# Patient Record
Sex: FEMALE | Race: WHITE | NOT HISPANIC OR LATINO
[De-identification: names, ages, dates, MRNs, and addresses within clinical notes are randomized per-mention and may not be internally consistent; named-entity substitution may affect disease eponyms.]

---

## 2020-04-23 ENCOUNTER — APPOINTMENT (OUTPATIENT)
Dept: GASTROENTEROLOGY | Facility: CLINIC | Age: 50
End: 2020-04-23
Payer: COMMERCIAL

## 2020-04-23 VITALS
HEART RATE: 68 BPM | DIASTOLIC BLOOD PRESSURE: 60 MMHG | OXYGEN SATURATION: 98 % | TEMPERATURE: 97.7 F | HEIGHT: 69 IN | WEIGHT: 135 LBS | SYSTOLIC BLOOD PRESSURE: 110 MMHG | BODY MASS INDEX: 19.99 KG/M2

## 2020-04-23 DIAGNOSIS — K64.4 RESIDUAL HEMORRHOIDAL SKIN TAGS: ICD-10-CM

## 2020-04-23 PROBLEM — Z00.00 ENCOUNTER FOR PREVENTIVE HEALTH EXAMINATION: Status: ACTIVE | Noted: 2020-04-23

## 2020-04-23 PROCEDURE — 99214 OFFICE O/P EST MOD 30 MIN: CPT

## 2020-04-23 NOTE — HISTORY OF PRESENT ILLNESS
[FreeTextEntry1] : 49F hx anal fissures, notes anal pain/swelling x 6d, no fever/bleeding.  No change in BMs.  No abd pain/wt loss. Started day her father  (colon cancer).  Pain slightly worse w/ BMs - but generally very uncomfortable.  Worse when sitting.\par \par Colonoscopy/EGD  for diarrhea: nonspec microscopic infl changes on both, w/ also anal fissure and diverticulosis; never f/u for recommended celiac panel, poss. trial lialda (though self-imposed GFD for a while when she lost ins. coverage)  Diarrhea improved - even back on gluten.\par \par Colonoscopy 2018 - 1 advanced adenoma- due 2021\par \par Soc:  no tobacco or significant EtOH\par \par FHx: \par Father: , COLON CANCER, diagnosed with Cancer \par Mother: alive, COLON CANCER, diagnosed with Cancer \par Brother: alive, COLON POLYPS, diagnosed with Unlisted \par \par \par ROS:\par Constitutional:: no weight loss, fevers\par ENT: no deafness\par Eyes: not blind\par Neck: no LN\par Chest: no dyspnea/cough\par Cardiac: no chest pain\par Vascular: no leg swelling\par GI: no abdominal pain, nausea, vomiting, diarrhea, constipation, rectal bleeding, dysphagia, melena unless otherwise noted in HPI\par : no dysuria, dark urine\par Skin: no rashes, jaundice\par Heme: no bleeding\par Endocrine: no DM unless otherwise stated in HPI\par \par Px: (VS noted below)\par General: NAD\par Eyes: anicteric\par Oropharynx:  clear\par Neck: no LN\par Chest: normal respiratory effort\par CVS: regular\par Abd: soft, NT, ND, +BS, no HSM\par Ext: no atrophy\par Neuro: grossly nonfocal\par Rectum: normal HOLDEN except for L ext hemorrhoid, mild tender nonfluctuant/nonthrombosed/no erythema\par \par Labs/imaging/prior endoscopic results reviewed to the extent available and noted in HPI\par

## 2020-04-23 NOTE — ASSESSMENT
[FreeTextEntry1] : - Topical treatments - topical steroid, soaks, gentle wipes (witchhazel) or shower. Reviewed dietary and lifestyle modifications, including increased fluid, fiber intake, as well as habituation / following urge to defecate, cutting back on bread/pasta, and to consider starting fiber supplement (eg.,  psyllium).  Topical lidocaine for pain component.  If fever then ER, but no clear sign of abscess at this time.\par \par - Colon cancer screening - colonoscopy due in 9/2021\par \par

## 2020-04-24 ENCOUNTER — RECORD ABSTRACTING (OUTPATIENT)
Age: 50
End: 2020-04-24

## 2020-04-24 DIAGNOSIS — Z72.89 OTHER PROBLEMS RELATED TO LIFESTYLE: ICD-10-CM

## 2020-04-24 DIAGNOSIS — Z83.71 FAMILY HISTORY OF COLONIC POLYPS: ICD-10-CM

## 2020-04-24 DIAGNOSIS — Z80.0 FAMILY HISTORY OF MALIGNANT NEOPLASM OF DIGESTIVE ORGANS: ICD-10-CM

## 2020-09-12 ENCOUNTER — HOSPITAL ENCOUNTER (EMERGENCY)
Dept: HOSPITAL 74 - JVIRT | Age: 50
Discharge: HOME | End: 2020-09-12
Payer: COMMERCIAL

## 2020-09-12 DIAGNOSIS — Z11.59: Primary | ICD-10-CM

## 2020-09-12 PROCEDURE — C9803 HOPD COVID-19 SPEC COLLECT: HCPCS

## 2020-09-12 PROCEDURE — U0003 INFECTIOUS AGENT DETECTION BY NUCLEIC ACID (DNA OR RNA); SEVERE ACUTE RESPIRATORY SYNDROME CORONAVIRUS 2 (SARS-COV-2) (CORONAVIRUS DISEASE [COVID-19]), AMPLIFIED PROBE TECHNIQUE, MAKING USE OF HIGH THROUGHPUT TECHNOLOGIES AS DESCRIBED BY CMS-2020-01-R: HCPCS

## 2020-09-12 NOTE — TELE
HPI


Do you have fever,cough or shortness of breath?: Yes





- General


Reason For Visit: COVID TESTING


Time Seen by Provider: 09/12/20 13:34


History Source: Patient


Exam Limitations: Clinical Condition





- History of Present Illness


Timing/Duration: unsure


Associated Symptoms: reports: malaise.  denies: cough, fever/chills, loss of 

appetite, nausea/vomiting, shortness of breath, syncope





09/12/20 13:34


Patient with no significant past medical history presents to [facility] urgent care

for COVID testing due to feeling some malaise and nasal congestion and wants to 

make sure she does not have coughing.  Patient also reported having some 

intermittent loose stools.  Patient works as a respiratory therapist in the 

pediatric clinic.  Denies fever, shortness of breath, recent travel or known 

sick positive COVID contact.  Denies any other symptoms





**Review of Systems





- Review of Systems


Able to Perform ROS?: Yes


Constitutional: Yes: Malaise.  No: Chills, Fever


HEENTM: Yes: Symptoms Reported, See HPI, Nose Congestion.  No: Eye Pain, Blurred

Vision, Tearing, Recent change in vision, Double Vision, Cataracts, Ear Pain, 

Ocular Prothesis, Ear Discharge, Nose Pain, Tinnitus, Nose Bleeding, Hearing 

Loss, Throat Pain, Throat Swelling, Mouth Pain, Dental Problems, Difficulty 

Swallowing, Mouth Swelling, Other


Respiratory: No: Symptoms reported, See HPI, Cough, Orthopnea, Shortness of 

Breath, SOB with Exertion, SOB at Rest, Stridor, Wheezing, Productive cough, 

Hemoptysis, Other


Cardiac (ROS): No: Symptoms Reported, See HPI, Chest Pain, Edema, Irregular 

Heart Rate, Lightheadedness, Palpitations, Syncope, Chest Tightness, Other


ABD/GI: Yes: Symptoms Reported, See HPI, Diarrhea (intermittent).  No: Nausea, 

Vomiting


Integumentary: No: Symptoms Reported


Neurological: No: Symptoms reported, Headache, Dizziness


All Other Systems: Reviewed and Negative





*Physical Exam





- Physical Exam


General Appearance: Yes: Nourished, Appropriately Dressed.  No: Apparent 

Distress


HEENT: positive: Normal ENT Inspection


Respiratory/Chest: negative: Respiratory Distress, Accessory Muscle Use


Musculoskeletal: positive: Normal Inspection


Extremity: positive: Normal Inspection, Normal Range of Motion


Integumentary: positive: Normal Color


Neurologic: positive: Fully Oriented, Alert, Normal Mood/Affect, Normal 

Response, Motor Strength 5/5





- Medical Decision Making





09/12/20 13:35


Patient with no significant past medical history presents to [facility] urgent care

for COVID testing due to feeling some malaise and nasal congestion and wants to 

make sure she does not have coughing.  Patient also reported having some 

intermittent loose stools.  Patient works as a respiratory therapist in the 

pediatric clinic.  Denies fever, shortness of breath, recent travel or known 

sick positive COVID contact.  Denies any other symptoms





Clinical exam unremarkable with patient in no acute respiratory distress and 

patient afebrile.





Discussed with patient self quarantine instructions.  COVID tests ordered as per

patient request and patient will go to [facility] 

today for testing.


Patient stable for discharge





Discharge


Diagnosis at time of Disposition: 


 Encounter by telehealth for suspected COVID-19








- Referrals


Follow-up Referral(s): 


Onofre Tian [Primary Care Provider] - 





- Patient Instructions


Discharge Instructions:  [facility]Coron[facility] Instructions, [facility] COVID-19 Isolation Protocol





- Discharge


Disposition: HOME


Condition at time of Disposition: Stable

## 2020-11-30 ENCOUNTER — APPOINTMENT (OUTPATIENT)
Dept: NEUROLOGY | Facility: CLINIC | Age: 50
End: 2020-11-30
Payer: COMMERCIAL

## 2020-11-30 VITALS
SYSTOLIC BLOOD PRESSURE: 119 MMHG | HEART RATE: 84 BPM | BODY MASS INDEX: 19.26 KG/M2 | HEIGHT: 69 IN | WEIGHT: 130 LBS | DIASTOLIC BLOOD PRESSURE: 76 MMHG

## 2020-11-30 DIAGNOSIS — H57.12 OCULAR PAIN, LEFT EYE: ICD-10-CM

## 2020-11-30 DIAGNOSIS — H91.93 UNSPECIFIED HEARING LOSS, BILATERAL: ICD-10-CM

## 2020-11-30 DIAGNOSIS — R25.2 CRAMP AND SPASM: ICD-10-CM

## 2020-11-30 DIAGNOSIS — H02.401 UNSPECIFIED PTOSIS OF RIGHT EYELID: ICD-10-CM

## 2020-11-30 PROCEDURE — 99204 OFFICE O/P NEW MOD 45 MIN: CPT

## 2020-11-30 PROCEDURE — 99072 ADDL SUPL MATRL&STAF TM PHE: CPT

## 2020-12-01 PROBLEM — H91.93 BILATERAL HEARING LOSS, UNSPECIFIED HEARING LOSS TYPE: Status: ACTIVE | Noted: 2020-11-30

## 2020-12-01 NOTE — ASSESSMENT
[FreeTextEntry1] : Imaging_ MRI head with and without contrast\par Labs.\par Further recommendations once tests available.

## 2020-12-01 NOTE — CONSULT LETTER
[Dear  ___] : Dear  [unfilled], [Consult Letter:] : I had the pleasure of evaluating your patient, [unfilled]. [Please see my note below.] : Please see my note below. [FreeTextEntry3] : Sincerely,\par \par Eryn Daniel M.D.\par

## 2020-12-01 NOTE — REVIEW OF SYSTEMS
[Depression] : depression [As Noted in HPI] : as noted in HPI [Loss Of Hearing] : hearing loss [Negative] : Heme/Lymph [FreeTextEntry2] : fatigue, weakness, nightsweats, allergy [FreeTextEntry4] : postnasal drip, tinnitus [FreeTextEntry9] : muscle aches

## 2020-12-01 NOTE — HISTORY OF PRESENT ILLNESS
[FreeTextEntry1] : This is a 50-year-old woman who is being seen in neurologic consultation at the request of Dr. Amanda Talamantes. Patient describes an inability to open her left eye when she first wakes up. The right eye opens but the "left eye doesn’t want to initially."  No temporal association.  After some time it does. She also has burning pain in her right eye. She describes twitching in the right eyelid which tends to worsen after caffeine.\par She also has intermittent fatigue which is not temporal. She describes severe muscle cramping at night to the point where her foot will even curl in. Her muscles can hurt a lot. She is a runner and typically runs between 12-15 miles a week. She notes that she when she tries to wean off Lexapro the muscle pain gets worse. Running is essential to help her moods. She is a very active woman. She is a pediatric physical therapist.\par Of note ophthalmologic evaluation was normal.\par notes hearing loss and tinnitus - was advised to get hearing tested 2 years ago but didn’t.

## 2021-08-18 ENCOUNTER — NON-APPOINTMENT (OUTPATIENT)
Age: 51
End: 2021-08-18

## 2021-08-18 DIAGNOSIS — Z92.89 PERSONAL HISTORY OF OTHER MEDICAL TREATMENT: ICD-10-CM

## 2021-08-18 DIAGNOSIS — G98.8 OTHER DISORDERS OF NERVOUS SYSTEM: ICD-10-CM

## 2021-08-18 DIAGNOSIS — J45.909 UNSPECIFIED ASTHMA, UNCOMPLICATED: ICD-10-CM

## 2021-08-18 DIAGNOSIS — Z98.890 OTHER SPECIFIED POSTPROCEDURAL STATES: ICD-10-CM

## 2021-08-18 DIAGNOSIS — M24.159 OTHER ARTICULAR CARTILAGE DISORDERS, UNSPECIFIED HIP: ICD-10-CM

## 2021-08-18 DIAGNOSIS — D21.10: ICD-10-CM

## 2021-08-18 DIAGNOSIS — E78.00 PURE HYPERCHOLESTEROLEMIA, UNSPECIFIED: ICD-10-CM

## 2021-08-18 DIAGNOSIS — F32.9 MAJOR DEPRESSIVE DISORDER, SINGLE EPISODE, UNSPECIFIED: ICD-10-CM

## 2021-08-20 ENCOUNTER — RESULT REVIEW (OUTPATIENT)
Age: 51
End: 2021-08-20

## 2021-08-22 ENCOUNTER — RESULT REVIEW (OUTPATIENT)
Age: 51
End: 2021-08-22

## 2021-08-23 ENCOUNTER — RESULT REVIEW (OUTPATIENT)
Age: 51
End: 2021-08-23

## 2021-08-23 ENCOUNTER — APPOINTMENT (OUTPATIENT)
Dept: SURGERY | Facility: HOSPITAL | Age: 51
End: 2021-08-23
Payer: COMMERCIAL

## 2021-08-23 PROCEDURE — 23075 EXC SHOULDER LES SC < 3 CM: CPT | Mod: LT

## 2021-09-01 ENCOUNTER — APPOINTMENT (OUTPATIENT)
Dept: SURGERY | Facility: CLINIC | Age: 51
End: 2021-09-01
Payer: COMMERCIAL

## 2021-09-01 VITALS — WEIGHT: 135 LBS | HEIGHT: 69 IN | BODY MASS INDEX: 19.99 KG/M2

## 2021-09-01 DIAGNOSIS — D17.22 BENIGN LIPOMATOUS NEOPLASM OF SKIN AND SUBCUTANEOUS TISSUE OF LEFT ARM: ICD-10-CM

## 2021-09-01 PROCEDURE — 99024 POSTOP FOLLOW-UP VISIT: CPT

## 2021-09-01 RX ORDER — HYDROCORTISONE 25 MG/G
2.5 CREAM TOPICAL 3 TIMES DAILY
Qty: 1 | Refills: 1 | Status: DISCONTINUED | COMMUNITY
Start: 2020-04-23 | End: 2021-09-01

## 2021-09-01 RX ORDER — CETIRIZINE HYDROCHLORIDE 10 MG/1
10 CAPSULE, LIQUID FILLED ORAL
Refills: 0 | Status: ACTIVE | COMMUNITY

## 2021-09-01 RX ORDER — ESCITALOPRAM OXALATE 10 MG/1
10 TABLET, FILM COATED ORAL
Refills: 0 | Status: ACTIVE | COMMUNITY

## 2021-09-01 RX ORDER — LIDOCAINE 50 MG/G
5 CREAM TOPICAL DAILY
Qty: 1 | Refills: 0 | Status: DISCONTINUED | COMMUNITY
Start: 2020-04-23 | End: 2021-09-01

## 2021-09-01 NOTE — PLAN
[FreeTextEntry1] : Patient is status post excision of a soft tissue mass from her left shoulder.  She has no complaints.  Pathology confirms it to lipoma.\par \par On exam the incision is well-healed without sign of infection.\par \par Plan: Patient will avoid sunlight for 1 year to improve scar cosmesis.  She will follow with us on as-needed basis.

## 2022-11-18 ENCOUNTER — APPOINTMENT (OUTPATIENT)
Dept: GASTROENTEROLOGY | Facility: CLINIC | Age: 52
End: 2022-11-18

## 2022-11-18 ENCOUNTER — NON-APPOINTMENT (OUTPATIENT)
Age: 52
End: 2022-11-18

## 2022-11-18 VITALS
TEMPERATURE: 97.4 F | DIASTOLIC BLOOD PRESSURE: 72 MMHG | SYSTOLIC BLOOD PRESSURE: 121 MMHG | HEIGHT: 69 IN | WEIGHT: 140 LBS | BODY MASS INDEX: 20.73 KG/M2 | HEART RATE: 85 BPM | OXYGEN SATURATION: 99 %

## 2022-11-18 DIAGNOSIS — Z86.010 PERSONAL HISTORY OF COLONIC POLYPS: ICD-10-CM

## 2022-11-18 PROCEDURE — 99212 OFFICE O/P EST SF 10 MIN: CPT

## 2022-11-18 NOTE — ASSESSMENT
[FreeTextEntry1] : - Colon cancer screening - colonoscopy due  - Colonoscopy scheduled - Risks, benefits, alternatives were discussed, including but not limited to bleeding, infection, perforation and sedation risks. Additionally, the possibility of missed lesions was conveyed.\par \par PMD/consultation/hospital notes and Labs/imaging/prior endoscopic results reviewed to extent noted in HPI; and, if procedure code billed on this visit for lab draw, this serves to signify that labs were drawn here in this office.\par \par

## 2022-11-18 NOTE — CONSULT LETTER
[FreeTextEntry1] : Dear Dr. FARNAZ CEDENO ,\par \par I had the pleasure of evaluating your patient,  MELITON ATKINS.\par \par Please refer to my note below.\par \par Thank you very much for allowing me to participate in the care of this patient.  If you have any questions, please do not hesitate to contact me.\par \par Sincerely, \par \par Amaury Viera MD\par

## 2022-11-18 NOTE — REASON FOR VISIT
[Consultation] : a consultation visit [FreeTextEntry1] : Kindly asked by Dr. Lopez to consult and evaluate patient for                    colon screening\par A copy of this note is being sent to physician requesting consultation.

## 2022-11-18 NOTE — HISTORY OF PRESENT ILLNESS
[FreeTextEntry1] : 52F hx anal fissures, hemorrhoids, presenting for colon cancer screening. Pt denies abdominal pain, rectal bleeding, change in bowel habits, or unexplained weight loss.  \par \par Prior colonoscopy:\par \par Colonoscopy/EGD  for diarrhea: nonspec microscopic infl changes on both, w/ also anal fissure and diverticulosis; never f/u for recommended celiac panel, poss. trial lialda (though self-imposed GFD for a while when she lost ins. coverage)  Diarrhea improved - even back on gluten.\par \par Colonoscopy 2018 - 1 advanced adenoma- due 2021\par \par Soc:  no tobacco or significant EtOH\par \par FHx: \par Father: , COLON CANCER, diagnosed with Cancer \par Mother: alive, COLON CANCER, diagnosed with Cancer \par Brother: alive, COLON POLYPS, diagnosed with Unlisted \par \par ROS:\par Constitutional:: no weight loss, fevers\par ENT: no deafness\par Eyes: not blind\par Neck: no LN\par Chest: no dyspnea/cough\par Cardiac: no chest pain\par Vascular: no leg swelling\par GI: no abdominal pain, nausea, vomiting, diarrhea, constipation, rectal bleeding, dysphagia, melena unless otherwise noted in HPI\par : no dysuria, dark urine\par Skin: no rashes, jaundice\par Heme: no bleeding\par Endocrine: no DM unless otherwise stated in HPI\par \par Px: (VS noted below)\par General: NAD\par Eyes: anicteric\par Oropharynx:  clear\par Neck: no LN\par Chest: normal respiratory effort\par CVS: regular\par Abd: soft, NT, ND, +BS, no HSM\par Ext: no atrophy\par Neuro: grossly nonfocal\par \par Labs/imaging/prior endoscopic results reviewed to the extent available and noted in HPI\par

## 2022-12-16 ENCOUNTER — RESULT REVIEW (OUTPATIENT)
Age: 52
End: 2022-12-16

## 2022-12-19 ENCOUNTER — RESULT REVIEW (OUTPATIENT)
Age: 52
End: 2022-12-19

## 2022-12-19 ENCOUNTER — APPOINTMENT (OUTPATIENT)
Dept: GASTROENTEROLOGY | Facility: HOSPITAL | Age: 52
End: 2022-12-19